# Patient Record
Sex: FEMALE | Race: WHITE | NOT HISPANIC OR LATINO | Employment: UNEMPLOYED | URBAN - METROPOLITAN AREA
[De-identification: names, ages, dates, MRNs, and addresses within clinical notes are randomized per-mention and may not be internally consistent; named-entity substitution may affect disease eponyms.]

---

## 2018-05-15 ENCOUNTER — HOSPITAL ENCOUNTER (EMERGENCY)
Facility: HOSPITAL | Age: 38
Discharge: HOME | End: 2018-05-15
Attending: EMERGENCY MEDICINE
Payer: COMMERCIAL

## 2018-05-15 ENCOUNTER — APPOINTMENT (EMERGENCY)
Dept: RADIOLOGY | Facility: HOSPITAL | Age: 38
End: 2018-05-15
Payer: COMMERCIAL

## 2018-05-15 VITALS
HEART RATE: 70 BPM | OXYGEN SATURATION: 100 % | HEIGHT: 64 IN | SYSTOLIC BLOOD PRESSURE: 172 MMHG | RESPIRATION RATE: 16 BRPM | TEMPERATURE: 98.1 F | WEIGHT: 285.06 LBS | BODY MASS INDEX: 48.67 KG/M2 | DIASTOLIC BLOOD PRESSURE: 93 MMHG

## 2018-05-15 DIAGNOSIS — S86.811A STRAIN OF CALF MUSCLE, RIGHT, INITIAL ENCOUNTER: Primary | ICD-10-CM

## 2018-05-15 PROCEDURE — 73590 X-RAY EXAM OF LOWER LEG: CPT

## 2018-05-15 PROCEDURE — 99283 EMERGENCY DEPT VISIT LOW MDM: CPT

## 2018-05-15 NOTE — ED PROVIDER NOTES
History  Chief Complaint   Patient presents with    Leg Pain     Pt  was getting child out of car and felt a "Pop" in right calf, now experiencing pain     30 yr female - states stepped foreward with r foot to get child out of car seat and felt pop then pain in r upper calf-- no fall or any other injury --         History provided by:  Patient   used: No        Prior to Admission Medications   Prescriptions Last Dose Informant Patient Reported? Taking?   amoxicillin (AMOXIL) 500 mg capsule   Yes No   Sig: Take 500 mg by mouth 2 (two) times a day  X 10 days- started 2/13/15   amoxicillin (AMOXIL) 875 mg tablet   No No   Sig: Take 1 tablet (875 mg total) by mouth 2 (two) times a day  ibuprofen (MOTRIN) 200 mg tablet   Yes No   Sig: Take 200 mg by mouth every 6 (six) hours as needed for mild pain  ibuprofen (MOTRIN) 600 mg tablet   No No   Sig: Take 1 tablet (600 mg total) by mouth every 6 (six) hours as needed for mild pain for up to 15 doses  Facility-Administered Medications: None       Past Medical History:   Diagnosis Date    Ovarian cyst        Past Surgical History:   Procedure Laterality Date     SECTION         No family history on file  I have reviewed and agree with the history as documented  Social History   Substance Use Topics    Smoking status: Never Smoker    Smokeless tobacco: Never Used    Alcohol use No        Review of Systems   Constitutional: Negative  HENT: Negative  Eyes: Negative  Respiratory: Negative  Cardiovascular: Negative  Gastrointestinal: Negative  Endocrine: Negative  Genitourinary: Negative  Musculoskeletal: Negative  R upper calf pain    Skin: Negative  Allergic/Immunologic: Negative  Neurological: Negative  Hematological: Negative  Psychiatric/Behavioral: Negative          Physical Exam  ED Triage Vitals [05/15/18 0951]   Temperature Pulse Respirations Blood Pressure SpO2   98 1 °F (36 7 °C) 70 16 (!) 172/93 100 %      Temp Source Heart Rate Source Patient Position - Orthostatic VS BP Location FiO2 (%)   Oral Monitor Sitting Right arm --      Pain Score       9           Orthostatic Vital Signs  Vitals:    05/15/18 0951   BP: (!) 172/93   Pulse: 70   Patient Position - Orthostatic VS: Sitting       Physical Exam   Constitutional: She appears well-developed and well-nourished  No distress  avss--  Pulse ox 100 % on ra- intepretation is normal- no intervention    Musculoskeletal:   rle- nt at hip/groin/ pelvis- knee-- pos prox lateral calf area of tenderness- no ecchymosis-- nt achilles - with normal achilles tendon function -- ankle nt- normal distal pulse-sensation/strength/cap refill/rom   Skin: She is not diaphoretic  Nursing note and vitals reviewed  ED Medications  Medications - No data to display    Diagnostic Studies  Results Reviewed     None                 XR tibia fibula 2 views RIGHT    (Results Pending)              Procedures  Procedures       Phone Contacts  ED Phone Contact    ED Course  ED Course as of May 15 1133   Tue May 15, 2018   1109 R tib/fib xray - no evidence of fx                                 MDM  CritCare Time    Disposition  Final diagnoses:   None     ED Disposition     None      Follow-up Information    None       Patient's Medications   Discharge Prescriptions    No medications on file     No discharge procedures on file      ED Provider  Electronically Signed by           Kiarra Church MD  05/15/18 3440

## 2018-05-15 NOTE — DISCHARGE INSTRUCTIONS
Diagnosis; RIGHT UPPER CALF MUSCLE STRAIN       - ACTIVITY AS TOLERATED    - LIGHT NOT TIGHT ACE WRAP TO AREA FOR COMFORT- CRUTCHES AS NEEDED     - FOR PAIN- TAKE OVER THE COUNTER IBUPROFEN 400 MG - TAKEN WITH OVER THE COUNTER TYLENOL 500 MG - GET GENERICS- 4 TIMES A DAY  WITH MEALS/LIQUIDS    - INTERMITENT ICE TO AREA OF CALF- FOR NEXT 24 HRS- 3-4 TIMES AT 15 MINUTES AT A TIME    - WHEN RESTING ELEVATE RIGHT LOWER LEG     - EXPECT DO TO GRAVITY FOR SWELLING / BRUISING TO DEVELOP LOWER IN CALF AND AROUND ANKLE OVER NEXT SEVERAL DAYS TO WEEK - AND TO RESOLVE ON ITS  OWN OVER SEVERAL WEEKS    - IF NOT GETTING ANY BETTER AFTER 1 WEEK - PLEASE CALL  THE ORTHOPEDIC DOCTOR TO SCHEDULE AN APPOINTMENT- YOU CAN SEE ANY DOCTOR IN T HE GROUP

## 2019-09-19 NOTE — ED NOTES
Pt appeared to be in no acute distress upon discharge  Verbal understanding obtained from pt on d/c instructions as well as medications and follow up care  Pt able to ambulate well with crutches  Pt seen assessed and d/c by provider       Blanca Sher RN  05/15/18 2656
English

## 2021-04-13 DIAGNOSIS — Z23 ENCOUNTER FOR IMMUNIZATION: ICD-10-CM

## 2022-02-24 ENCOUNTER — OFFICE VISIT (OUTPATIENT)
Dept: INTERNAL MEDICINE CLINIC | Facility: CLINIC | Age: 42
End: 2022-02-24
Payer: COMMERCIAL

## 2022-02-24 VITALS
TEMPERATURE: 97.3 F | DIASTOLIC BLOOD PRESSURE: 118 MMHG | HEART RATE: 105 BPM | WEIGHT: 291 LBS | SYSTOLIC BLOOD PRESSURE: 180 MMHG | OXYGEN SATURATION: 98 % | BODY MASS INDEX: 49.68 KG/M2 | HEIGHT: 64 IN

## 2022-02-24 DIAGNOSIS — Z00.00 ENCOUNTER FOR MEDICAL EXAMINATION TO ESTABLISH CARE: ICD-10-CM

## 2022-02-24 DIAGNOSIS — E66.2 CLASS 3 OBESITY WITH ALVEOLAR HYPOVENTILATION WITHOUT SERIOUS COMORBIDITY WITH BODY MASS INDEX (BMI) OF 45.0 TO 49.9 IN ADULT (HCC): ICD-10-CM

## 2022-02-24 DIAGNOSIS — G47.33 OSA (OBSTRUCTIVE SLEEP APNEA): ICD-10-CM

## 2022-02-24 DIAGNOSIS — E55.9 HYPOVITAMINOSIS D: ICD-10-CM

## 2022-02-24 DIAGNOSIS — I10 PRIMARY HYPERTENSION: Primary | ICD-10-CM

## 2022-02-24 PROBLEM — E66.813: Status: ACTIVE | Noted: 2022-02-24

## 2022-02-24 PROCEDURE — 99204 OFFICE O/P NEW MOD 45 MIN: CPT | Performed by: HOSPITALIST

## 2022-02-24 RX ORDER — MELATONIN
1000 DAILY
Qty: 90 TABLET | Refills: 2 | Status: SHIPPED | OUTPATIENT
Start: 2022-02-24

## 2022-02-24 RX ORDER — LOSARTAN POTASSIUM 25 MG/1
25 TABLET ORAL DAILY
Qty: 90 TABLET | Refills: 1 | Status: SHIPPED | OUTPATIENT
Start: 2022-02-24 | End: 2022-06-07

## 2022-02-24 NOTE — PROGRESS NOTES
INTERNAL MEDICINE INITIAL OFFICE VISIT  [facility] - [facility] INTERNAL MEDICINE [place]    NAME: Dulce Maria Rubio  AGE: 46 y o  SEX: female  : 1980     DATE: 2022     Assessment and Plan:     1  Primary hypertension  Patient with a history of hypertension and was on lisinopril until 2 years ago when she stopped it on her own  Blood pressure recently noted to be 180/110  Denies any history of headaches, abdominal pain, chest pain, palpitations, shortness of breath  Has a strong family history of hypertension in mother father and brother  Will start patient on losartan 25 mg daily and titrate depending on blood pressure medicines  May also need an evaluation for sleep apnea and will also need weight loss  Dietary advice given  2  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 with body mass index (BMI) of 45 0 to 49 9 in [facility])  Has thick neck, crowded posterior pharynx, BMI of 49  Denies any snoring but  states that she tosses and turns in bed   Patient has a stop Bang score of 4--BMI more than 35, neck circumference within 40, treated for high blood pressure, feeling tired fatigued sleepy--giving her high risk for obstructive sleep apnea  Will refer her for a sleep study as an outpatient--patient currently does not want to be referred will do it next visit    3  Encounter for medical examination to establish care  See my notes above    4-hypovitaminosis D-patient's level was 90 when checked around 2 years ago  Patient is an indoor person    Recommended 1000 units of vitamin-D and check vitamin-D levels today    Plan to check  Glycosylated hemoglobin  Vitamin-D  Lipid profile  TSH    Recommended Pap smear, mammogram as preventive measures however patient wants to wait to low blood pressures control before going ahead with any of these  Vaccinated against COVID  3 months     Chief Complaint:     Chief Complaint   Patient presents with   [facility] Establish Care     high blood pressure      History of Present Illness:     Ann Leung is an extremely pleasant 57-year-old female coming to establish care at this clinic  Last follow-up with the primary care was 5 years ago  Patient had dental work recently and was noted to have elevated blood pressure  Prior to that was on lisinopril which she stopped 2 years ago has not resumed  Denies any chest pain palpitations, shortness of bread  History of mother and father with high blood pressure  Complains of some weakness fatigue tiredness  Denies any snoring but tosses and turns in bed  BMI is 49+ P blood pressure today was 180/118  No visual complaints  Vitamin-D level done reviously was low at 20  Periods are regular      The following portions of the patient's history were reviewed and updated as appropriate: allergies, current medications, past family history, past medical history, past social history, past surgical history and problem list      Review of Systems:     Review of Systems all other review of symptoms negative unless specified otherwise     Past Medical History:     Past Medical History:   Diagnosis Date    Ovarian cyst         Past Surgical History:     Past Surgical History:   Procedure Laterality Date     SECTION      x2    HERNIA REPAIR      OVARIAN CYST REMOVAL          Social History:   She reports that she has never smoked  She has never used smokeless tobacco  She reports that she does not drink alcohol and does not use drugs  Family History:     Family History   Problem Relation Age of Onset   [facility] Breast cancer Mother     Hypertension Mother     Hypertension Father         Current Medications:     Current Outpatient Medications:     amoxicillin (AMOXIL) 875 mg tablet, Take 1 tablet (875 mg total) by mouth 2 (two) times a day   (Patient not taking: Reported on 2022 ), Disp: 20 tablet, Rfl: 0    ibuprofen (MOTRIN) 200 mg tablet, Take 200 mg by mouth every 6 (six) hours as needed for mild pain  (Patient not taking: Reported on 2/24/2022 ), Disp: , Rfl:     ibuprofen (MOTRIN) 600 mg tablet, Take 1 tablet (600 mg total) by mouth every 6 (six) hours as needed for mild pain for up to 15 doses  (Patient not taking: Reported on 2/24/2022 ), Disp: 15 tablet, Rfl: 0     Allergies:   No Known Allergies     Physical Exam:     BP (!) 180/118 (BP Location: Left arm, Patient Position: Sitting, Cuff Size: Standard)   Pulse 105   Temp (!) 97 3 °F (36 3 °C) (Tympanic)   Ht 5' 4" (1 626 m)   Wt 132 kg (291 lb)   SpO2 98%   BMI 49 95 kg/m²     Physical Exam   General Appearance:    Alert, cooperative, no distress, obese  Thick neck, crowded posterior pharynx  Head:    Normocephalic, without obvious abnormality, atraumatic   Eyes:    PERRL, conjunctiva/corneas clear, EOM's intact, fundi     benign, both eyes        Neck:   Supple, no adenopathy, no JVD   Back:     Symmetric, no curvature, ROM normal, no CVA tenderness   Lungs:     Clear to auscultation bilaterally, no wheezing or rhonchi   Heart:    Regular rate and rhythm, S1 and S2 normal, no murmur, rub   or gallop   Abdomen:     Soft, non-tender, bowel sounds active    Extremities:   Extremities normal, atraumatic, no cyanosis or edema   Psych:   Normal Affect   Neurologic:   CNII-XII intact   Normal strength, sensation and reflexes       Throughout        Data:     Laboratory Results:  None available-order  Radiology/Other Diagnostic Testing Results:  None available    Tianna French MD  [facility] INTERNAL MEDICINE [place]

## 2022-02-24 NOTE — PATIENT INSTRUCTIONS
Below you will find a very helpful diet, known as the DASH diet which is beneficial especially for blood pressure, but also excellent for those with diabetes or cholesterol problems  If you will apply yourself to this, I think you will find it very helpful  Your Guide to Lowering Your Blood Pressure With DASH     Introduction  Blood pressure is the force of blood against artery walls  What you choose to eat affects your chances of developing high blood pressure, or hypertension (the medical term)  Recent studies show that blood pressure can be lowered by following the Dietary Approaches to Stop Hypertension (DASH) eating plan--and by eating less salt, also called sodium  While each step alone lowers blood pressure, the combination of the eating plan and a reduced sodium intake gives the biggest benefit and may help prevent the development of high blood pressure  The menus and recipes are given for two levels of daily sodium consumption-- 2,300 and 1,500 milligrams per day  Twenty-three hundred milligrams is the highest level considered acceptable by the [facility] Blood Pressure Education Program  It is also the highest amount recommended for healthy Americans by the 2005 "[place]  Dietary Guidelines for Americans " The 1,500 milligram level can lower blood pressure further and more recently is the amount recommended by the [address] as an adequate intake level and one that most people should try to achieve  It's easy to adopt the DASH eating plan  Here are some ways to get started:     Change gradually   If you now eat one or two vegetables a day, add a serving at lunch and another at dinner  If you don't eat fruit now or have juice only at breakfast, add a serving to your meals or have it as a snack  Gradually increase your use of fat-free and low-fat milk and milk products to three servings a day   For example, drink milk with lunch or dinner, instead of soda, sugar--sweetened tea, or alcohol  Choose fat-free (skim) or low-fat (1 percent) milk and milk products to reduce your intake of saturated fat, total fat, cholesterol, and calories and to increase your calcium  Read the Nutrition Facts label on margarines and salad dressings to choose those lowest in saturated fat and trans fat  Treat meats as one part of the whole meal, instead of the focus   Limit lean meats to 6 ounces a day & all that's needed  Have only 3 ounces at a meal, which is about the size of a deck of cards  If you now eat large portions of meats, cut them back gradually-- by a half or a third at each meal    Include two or more vegetarian-style (meatless) meals each week  Increase servings of vegetables, brown rice, whole wheat pasta, and cooked dry beans in meals  Try casseroles, whole wheat pasta, and stir-jacobson dishes, which have less meat and more vegetables, grains, and dry beans  Use fruits or other foods low in saturated fat, trans fat, cholesterol, sodium, sugar, and calories as desserts and snacks   Fruits and other lower fat foods offer great taste and variety  Use fruits canned in their own juice or packed in water  Fresh fruits require little or no preparation  Dried fruits are a good choice to carry with you or to have ready in the car  Try these snacks ideas: unsalted rice cakes; nuts mixed with raisins; carlotta crackers; fat-free and low-fat yogurt and frozen yogurt; popcorn with no salt or butter added; raw vegetables  Try these other tips   Choose whole grain foods for most grain servings to get added nutrients, such as minerals and fiber  For example, choose whole wheat bread or whole grain cereals  If you have trouble digesting milk and milk products, try taking lactase enzyme pills (available at drugstores and groceries) with the milk products  Or, buy lactose-free milk, which has the lactase enzyme already added to it     If you are allergic to nuts, use seeds or legumes (cooked dried beans or peas)  Use fresh, frozen, or low-sodium canned vegetables and fruits  How to Lower Calories on the [address] [place] [address]  The DASH eating plan can be adopted to promote weight loss  It is rich in lower-calorie foods, such as fruits and vegetables  You can make it lower in calories by replacing higher calorie foods such as sweets with more fruits and vegetables and that also will make it easier for you to reach your DASH goals  Here are some examples: To increase fruits   Eat a medium apple instead of four shortbread cookies  You'll save 80 calories  Eat 1/4 cup of dried apricots instead of a 2-ounce bag of pork rinds  You'll save 230 calories  To increase vegetables   Have a hamburger that's 3 ounces of meat instead of 6 ounces  Add a 1/2-cup serving of carrots and a 1/2-cup serving of spinach  You'll save more than 200 calories  Instead of 5 ounces of chicken, have a stir jacobson with 2 ounces of chicken and 11/2 cups of raw vegetables  Use a small amount of vegetable oil  You'll save 50 calories  To increase fat-free or low-fat milk products   Have a 1/2-cup serving of low-fat frozen yogurt instead of a 1/2-cup serving of full-fat ice cream  You'll save about 70 calories  Choose foods high in potassium  Where's the Potassium?  Potato, sweet potato, spinach, zucchini, tomato, kale, nicolle, mushrooms, cucumber, various fruits, nuts, seeds, and legumes, milk and some fish  And don't forget these calorie-saving tips:   Use fat-free or low-fat condiments  Use half as much vegetable oil, soft or liquid margarine, mayonnaise, or salad dressing, or choose available low-fat or fat-free versions  Eat smaller portions and cut back gradually  Choose fat-free or low-fat milk and milk products  Check the food labels to compare fat content in packaged foods and items marked fat-free or low-fat are not always lower in calories than their regular versions     Limit foods with lots of added sugar, such as pies, flavored yogurts, candy bars, ice cream, sherbet, regular soft drinks, and fruit drinks  Eat fruits canned in their own juice or in water  Add fruit to plain fat-free or low-fat yogurt  Snack on fruit, vegetable sticks, unbuttered and unsalted popcorn, or rice cakes  Drink water or club soda & zest it up with a wedge of lemon or lime  Tips To Reduce Salt and Sodium   Choose low- or reduced-sodium, or no-salt-added versions of foods and condiments when available  Choose fresh, frozen, or canned (low-sodium or no-salt-added) vegetables  Use fresh poultry, fish, and lean meat, rather than canned, smoked, or processed types  Choose ready-to-eat breakfast cereals that are lower in sodium  Limit cured foods (such as bae and ham); foods packed in brine (such as pickles, pickled vegetables, olives, and sauerkraut); and condiments (such as mustard, horseradish, ketchup, and barbecue sauce)  Limit even lower sodium versions of soy sauce and teriyaki sauce  Treat these condiments sparingly as you do table salt  Cook rice, pasta, and hot cereals without salt  Cut back on instant or flavored rice, pasta, and cereal mixes, which usually have added salt  Choose "convenience" foods that are lower in sodium  Cut back on frozen dinners, mixed dishes such as pizza, packaged mixes, canned soups or broths, and salad dressings--these often have a lot of sodium  Rinse canned foods, such as tuna and canned beans, to remove some of the sodium  Use spices instead of salt  In cooking and at the table, flavor foods with herbs, spices, lemon, lime, vinegar, or salt-free seasoning blends  Start by cutting salt in half  Make a Dash for DASH  Thirty minutes of moderate-intensity physical activity each day can help      If your blood pressure is moderately elevated, 30 minutes of brisk walking on most days a week may be enough to keep you off medication      If you take medication for high blood pressure, 30 minutes of moderate physical activity can make your medication work more effectively and make you feel better  If you don't have high blood pressure, being physically active can help keep it that way  If you have normal blood pressure & but are not active & your chances of developing high blood pressure increase, especially as you get older or if you become overweight or obese or develop diabetes  Additional tips to remember: Monitor salt and sodium when eating out and compare nutrition facts labels on foods  For more information and meals plans check out this website: [employer]

## 2022-06-07 ENCOUNTER — OFFICE VISIT (OUTPATIENT)
Dept: INTERNAL MEDICINE CLINIC | Facility: CLINIC | Age: 42
End: 2022-06-07
Payer: COMMERCIAL

## 2022-06-07 ENCOUNTER — APPOINTMENT (OUTPATIENT)
Dept: LAB | Facility: HOSPITAL | Age: 42
End: 2022-06-07
Attending: HOSPITALIST
Payer: COMMERCIAL

## 2022-06-07 ENCOUNTER — OFFICE VISIT (OUTPATIENT)
Dept: INTERNAL MEDICINE CLINIC | Facility: CLINIC | Age: 42
End: 2022-06-07

## 2022-06-07 VITALS
OXYGEN SATURATION: 97 % | SYSTOLIC BLOOD PRESSURE: 140 MMHG | HEART RATE: 83 BPM | WEIGHT: 288.6 LBS | DIASTOLIC BLOOD PRESSURE: 90 MMHG | BODY MASS INDEX: 49.27 KG/M2 | HEIGHT: 64 IN | TEMPERATURE: 98.2 F

## 2022-06-07 DIAGNOSIS — E55.9 HYPOVITAMINOSIS D: ICD-10-CM

## 2022-06-07 DIAGNOSIS — E66.2 CLASS 3 OBESITY WITH ALVEOLAR HYPOVENTILATION WITHOUT SERIOUS COMORBIDITY WITH BODY MASS INDEX (BMI) OF 45.0 TO 49.9 IN ADULT (HCC): ICD-10-CM

## 2022-06-07 DIAGNOSIS — Z12.31 ENCOUNTER FOR SCREENING MAMMOGRAM FOR MALIGNANT NEOPLASM OF BREAST: ICD-10-CM

## 2022-06-07 DIAGNOSIS — Z80.3 FAMILY HISTORY OF BREAST CANCER: ICD-10-CM

## 2022-06-07 DIAGNOSIS — I10 PRIMARY HYPERTENSION: ICD-10-CM

## 2022-06-07 DIAGNOSIS — G47.33 OSA (OBSTRUCTIVE SLEEP APNEA): ICD-10-CM

## 2022-06-07 DIAGNOSIS — E66.01 MORBID OBESITY WITH BMI OF 45.0-49.9, ADULT (HCC): ICD-10-CM

## 2022-06-07 DIAGNOSIS — Z00.00 ANNUAL PHYSICAL EXAM: Primary | ICD-10-CM

## 2022-06-07 DIAGNOSIS — Z12.4 SCREENING FOR CERVICAL CANCER: ICD-10-CM

## 2022-06-07 LAB
ANION GAP SERPL CALCULATED.3IONS-SCNC: 8 MMOL/L (ref 4–13)
BUN SERPL-MCNC: 12 MG/DL (ref 5–25)
CALCIUM SERPL-MCNC: 8.9 MG/DL (ref 8.4–10.2)
CHLORIDE SERPL-SCNC: 104 MMOL/L (ref 96–108)
CHOLEST SERPL-MCNC: 162 MG/DL
CO2 SERPL-SCNC: 26 MMOL/L (ref 21–32)
CREAT SERPL-MCNC: 0.81 MG/DL (ref 0.6–1.3)
ERYTHROCYTE [DISTWIDTH] IN BLOOD BY AUTOMATED COUNT: 13.4 % (ref 11.6–15.1)
EST. AVERAGE GLUCOSE BLD GHB EST-MCNC: 103 MG/DL
GFR SERPL CREATININE-BSD FRML MDRD: 91 ML/MIN/1.73SQ M
GLUCOSE P FAST SERPL-MCNC: 91 MG/DL (ref 65–99)
HBA1C MFR BLD: 5.2 %
HCT VFR BLD AUTO: 40.1 % (ref 34.8–46.1)
HDLC SERPL-MCNC: 44 MG/DL
HGB BLD-MCNC: 13 G/DL (ref 11.5–15.4)
LDLC SERPL CALC-MCNC: 101 MG/DL (ref 0–100)
MCH RBC QN AUTO: 27.8 PG (ref 26.8–34.3)
MCHC RBC AUTO-ENTMCNC: 32.4 G/DL (ref 31.4–37.4)
MCV RBC AUTO: 86 FL (ref 82–98)
NONHDLC SERPL-MCNC: 118 MG/DL
PLATELET # BLD AUTO: 234 THOUSANDS/UL (ref 149–390)
PMV BLD AUTO: 11 FL (ref 8.9–12.7)
POTASSIUM SERPL-SCNC: 3.8 MMOL/L (ref 3.5–5.3)
RBC # BLD AUTO: 4.68 MILLION/UL (ref 3.81–5.12)
SODIUM SERPL-SCNC: 138 MMOL/L (ref 135–147)
TRIGL SERPL-MCNC: 83 MG/DL
TSH SERPL DL<=0.05 MIU/L-ACNC: 1.21 UIU/ML (ref 0.45–4.5)
WBC # BLD AUTO: 5.31 THOUSAND/UL (ref 4.31–10.16)

## 2022-06-07 PROCEDURE — 80048 BASIC METABOLIC PNL TOTAL CA: CPT

## 2022-06-07 PROCEDURE — 36415 COLL VENOUS BLD VENIPUNCTURE: CPT

## 2022-06-07 PROCEDURE — 99395 PREV VISIT EST AGE 18-39: CPT | Performed by: INTERNAL MEDICINE

## 2022-06-07 PROCEDURE — 80061 LIPID PANEL: CPT

## 2022-06-07 PROCEDURE — 83036 HEMOGLOBIN GLYCOSYLATED A1C: CPT

## 2022-06-07 PROCEDURE — ERR1 ERRONEOUS ENCOUNTER-DISREGARD: Performed by: INTERNAL MEDICINE

## 2022-06-07 PROCEDURE — 1036F TOBACCO NON-USER: CPT | Performed by: INTERNAL MEDICINE

## 2022-06-07 PROCEDURE — 85027 COMPLETE CBC AUTOMATED: CPT

## 2022-06-07 PROCEDURE — 84443 ASSAY THYROID STIM HORMONE: CPT

## 2022-06-07 RX ORDER — LOSARTAN POTASSIUM 25 MG/1
50 TABLET ORAL DAILY
Qty: 60 TABLET | Refills: 0 | Status: SHIPPED | OUTPATIENT
Start: 2022-06-07 | End: 2022-07-05

## 2022-06-07 NOTE — ASSESSMENT & PLAN NOTE
Blood pressure was reviewing office it is 140/90   much improved from previous visit   will increase losartan to 50 mg once a day   monitor BMP in 2 weeks   follow-up in 1  Month for blood pressure

## 2022-06-07 NOTE — ASSESSMENT & PLAN NOTE
Patient reported her mother  had history of breast cancer,  Unilaterally in her 46s  Patient is considered moderate risk    Would recommend mammogram as early as possible

## 2022-06-07 NOTE — PATIENT INSTRUCTIONS
Wellness Visit for Adults   AMBULATORY CARE:   A wellness visit  is when you see your healthcare provider to get screened for health problems  Your healthcare provider will also give you advice on how to stay healthy  Write down your questions so you remember to ask them  Ask your healthcare provider how often you should have a wellness visit  What happens at a wellness visit:  Your healthcare provider will ask about your health, and your family history of health problems  This includes high blood pressure, heart disease, and cancer  He or she will ask if you have symptoms that concern you, if you smoke, and about your mood  You may also be asked about your intake of medicines, supplements, food, and alcohol  Any of the following may be done: Your weight  will be checked  Your height may also be checked so your body mass index (BMI) can be calculated  Your BMI shows if you are at a healthy weight  Your blood pressure  and heart rate will be checked  Your temperature may also be checked  Blood and urine tests  may be done  Blood tests may be done to check your cholesterol levels  Abnormal cholesterol levels increase your risk for heart disease and stroke  You may also need a blood or urine test to check for diabetes if you are at increased risk  Urine tests may be done to look for signs of an infection or kidney disease  A physical exam  includes checking your heartbeat and lungs with a stethoscope  Your healthcare provider may also check your skin to look for sun damage  Screening tests  may be recommended  A screening test is done to check for diseases that may not cause symptoms  The screening tests you may need depend on your age, gender, family history, and lifestyle habits  For example, colorectal screening may be recommended if you are 48years old or older  Screening tests you need if you are a woman:   A Pap smear  is used to screen for cervical cancer   Pap smears are usually done every 3 to 5 years depending on your age  You may need them more often if you have had abnormal Pap smear test results in the past  Ask your healthcare provider how often you should have a Pap smear  A mammogram  is an x-ray of your breasts to screen for breast cancer  Experts recommend mammograms every 2 years starting at age 48 years  You may need a mammogram at age 52 years or younger if you have an increased risk for breast cancer  Talk to your healthcare provider about when you should start having mammograms and how often you need them  Vaccines you may need:   Get an influenza vaccine  every year  The influenza vaccine protects you from the flu  Several types of viruses cause the flu  The viruses change over time, so new vaccines are made each year  Get a tetanus-diphtheria (Td) booster vaccine  every 10 years  This vaccine protects you against tetanus and diphtheria  Tetanus is a severe infection that may cause painful muscle spasms and lockjaw  Diphtheria is a severe bacterial infection that causes a thick covering in the back of your mouth and throat  Get a human papillomavirus (HPV) vaccine  if you are female and aged 23 to 32 or male 23 to 24 and never received it  This vaccine protects you from HPV infection  HPV is the most common infection spread by sexual contact  HPV may also cause vaginal, penile, and anal cancers  Get a pneumococcal vaccine  if you are aged 72 years or older  The pneumococcal vaccine is an injection given to protect you from pneumococcal disease  Pneumococcal disease is an infection caused by pneumococcal bacteria  The infection may cause pneumonia, meningitis, or an ear infection  Get a shingles vaccine  if you are 60 or older, even if you have had shingles before  The shingles vaccine is an injection to protect you from the varicella-zoster virus  This is the same virus that causes chickenpox   Shingles is a painful rash that develops in people who had chickenpox or have been exposed to the virus  How to eat healthy:  My Plate is a model for planning healthy meals  It shows the types and amounts of foods that should go on your plate  Fruits and vegetables make up about half of your plate, and grains and protein make up the other half  A serving of dairy is included on the side of your plate  The amount of calories and serving sizes you need depends on your age, gender, weight, and height  Examples of healthy foods are listed below:  Eat a variety of vegetables  such as dark green, red, and orange vegetables  You can also include canned vegetables low in sodium (salt) and frozen vegetables without added butter or sauces  Eat a variety of fresh fruits , canned fruit in 100% juice, frozen fruit, and dried fruit  Include whole grains  At least half of the grains you eat should be whole grains  Examples include whole-wheat bread, wheat pasta, brown rice, and whole-grain cereals such as oatmeal     Eat a variety of protein foods such as seafood (fish and shellfish), lean meat, and poultry without skin (turkey and chicken)  Examples of lean meats include pork leg, shoulder, or tenderloin, and beef round, sirloin, tenderloin, and extra lean ground beef  Other protein foods include eggs and egg substitutes, beans, peas, soy products, nuts, and seeds  Choose low-fat dairy products such as skim or 1% milk or low-fat yogurt, cheese, and cottage cheese  Limit unhealthy fats  such as butter, hard margarine, and shortening  Exercise:  Exercise at least 30 minutes per day on most days of the week  Some examples of exercise include walking, biking, dancing, and swimming  You can also fit in more physical activity by taking the stairs instead of the elevator or parking farther away from stores  Include muscle strengthening activities 2 days each week  Regular exercise provides many health benefits   It helps you manage your weight, and decreases your risk for type 2 diabetes, heart disease, stroke, and high blood pressure  Exercise can also help improve your mood  Ask your healthcare provider about the best exercise plan for you  General health and safety guidelines:   Do not smoke  Nicotine and other chemicals in cigarettes and cigars can cause lung damage  Ask your healthcare provider for information if you currently smoke and need help to quit  E-cigarettes or smokeless tobacco still contain nicotine  Talk to your healthcare provider before you use these products  Limit alcohol  A drink of alcohol is 12 ounces of beer, 5 ounces of wine, or 1½ ounces of liquor  Lose weight, if needed  Being overweight increases your risk of certain health conditions  These include heart disease, high blood pressure, type 2 diabetes, and certain types of cancer  Protect your skin  Do not sunbathe or use tanning beds  Use sunscreen with a SPF 15 or higher  Apply sunscreen at least 15 minutes before you go outside  Reapply sunscreen every 2 hours  Wear protective clothing, hats, and sunglasses when you are outside  Drive safely  Always wear your seatbelt  Make sure everyone in your car wears a seatbelt  A seatbelt can save your life if you are in an accident  Do not use your cell phone when you are driving  This could distract you and cause an accident  Pull over if you need to make a call or send a text message  Practice safe sex  Use latex condoms if are sexually active and have more than one partner  Your healthcare provider may recommend screening tests for sexually transmitted infections (STIs)  Wear helmets, lifejackets, and protective gear  Always wear a helmet when you ride a bike or motorcycle, go skiing, or play sports that could cause a head injury  Wear protective equipment when you play sports  Wear a lifejacket when you are on a boat or doing water sports      © Copyright [employer] 2022 Information is for End User's use only and may not be sold, redistributed or otherwise used for commercial purposes  All illustrations and images included in CareNotes® are the copyrighted property of A D A M , Inc  or Nam Unger  The above information is an  only  It is not intended as medical advice for individual conditions or treatments  Talk to your doctor, nurse or pharmacist before following any medical regimen to see if it is safe and effective for you  Champ Dumontosmany              6/7/2022    Physical Activity Guidelines for Adults:  150-300 minutes/week of moderate-intensity activity or  minutes/week of vigorous activity or a combination of both  Muscle strength training 2 or more times a week    Aerobic Activity Recommendation:   Frequency:    5 (days/week)  Intensity:  Light (casual walk)   Moderate (brisk walk)     Vigorous (like jogging)  Time:  30 minutes/day  Type:  Walk, Run,  Bike  Swim/Water Exercise   Other                     What about aerobic activity? Moderate activity is at a pace where you can talk, but cannot "sing " Examples: brisk walking, light biking, water exercise, & dancing  Vigorous activty is done at a pace where you cannot talk and may be out of breath  Examples: jogging, swimming, tennis, & fast bicycling  You can exercise for any length of time  For example, you might walk:   30 minutes 5 days/week or   20 minutes daily  5 minutes here, 10 minutes there  Just work your way up to 150 total minutes/week  Your ultimate goal is to gradually build up to 7500-10,000 steps/day  Muscle Strength Training Recommendation:   Frequency:    2 (days/week)    What about strength training? You don't have to go to a gym  Try elastic bands, do body weight exercises (chair sit-to-stands; floor, wall or kitchen counter push-ups; planks or bridges), or lift dumbbells  Heavy work around your home or yard also builds strength  Strengthen your legs, back, chest & arms   To start, try 10-15 repetitions using light effort  Build up to medium or hard effort for 8-12 repetitions  Repeat 2-4 times, 2-3 days/week  Give yourself a rest day between each strength training session  Prescriber's Name:   Dr Verner Malling     How will you get started THIS WEEK? Copyright 2019: Exercise is Medicine     Mediterranean Diet   AMBULATORY CARE:   A Mediterranean diet  is a meal plan that includes foods that are commonly eaten in countries that border the [facility]  This meal plan may provide several health benefits  These include losing or maintaining weight, and decreasing blood pressure, blood sugar, and cholesterol levels  It may also help protect against certain health conditions such as heart disease, cancer, type 2 diabetes, and Alzheimer disease  Work with a dietitian to develop a meal plan that is right for you  Foods to include in the [address] [facility] diet:   Include fruits and vegetables in each meal   Eat a variety of fresh fruits and vegetables  Choose whole grains every day  These foods include whole-grain breads, pastas, and cereals  It also includes brown rice, quinoa, and millet  Use unsaturated fats instead of saturated fats  Cook with olive or canola oil  Limit saturated fats, such as butter, margarine, and shortening  Saturated fat is an unhealthy fat that can increase your cholesterol levels  Choose plant foods, poultry, and fish as your main sources of protein  Eat plant-based foods that provide protein,  such as lentils, beans, chickpeas, nuts, and seeds  Choose mostly plant-based foods in place of meat on most days of the week  Eat protein foods high in omega-3 fats  Fish high in omega-3 fats include salmon, trout, and tuna  Include these types of fish 1 or 2 times each week  Limit fish high in mercury, such as shark, swordfish, tilefish, and bridgette mackerel  Omega-3 fats are also found in walnuts and flaxseed           Choose poultry (chicken or turkey) without skin instead of red meat  Red meat is high in saturated fat  Limit eggs and high-fat meats, such as bae, sausage, and hot dogs  Choose low-fat dairy foods  such as nonfat or 1% milk, or low-fat almond, cashew, or soy milk  Other examples include low-fat cheese, yogurt, and cottage cheese  Limit sweets  Limit your intake of high-sugar foods, such as soda, desserts, and candy  Talk to your healthcare provider about alcohol  Studies have shown that moderate intake of wine may reduce the risk of heart disease  A moderate amount of wine is 1 serving for women and men 65 years and older each day  Two servings is recommended for men 24to 59years of age each day  A serving of wine is 5 ounces  Other things you need to know if you follow the Mediterranean diet:   Include foods high in iron and vitamin C   Plant-based foods that are high in iron include spinach, beans, tofu, and artichoke  Eat a serving of vitamin C with any iron-rich food to help your body absorb more iron  Examples include oranges, strawberries, cantaloupe, broccoli, and yellow peppers  Get regular physical activity  The Mediterranean diet will have the most benefit if you get regular physical activity  Get 30 minutes of physical activity at least 5 days a week  Choose physical activities that increase your heart rate  Examples include walking, hiking, swimming, and riding a bike  Ask your healthcare provider about the best exercise plan for you  © Copyright [employer] 2022 Information is for End User's use only and may not be sold, redistributed or otherwise used for commercial purposes  All illustrations and images included in CareNotes® are the copyrighted property of A D A M , Inc  or [facility] [place] Alisha Hernández   The above information is an  only  It is not intended as medical advice for individual conditions or treatments   Talk to your doctor, nurse or pharmacist before following any medical regimen to see if it is safe and effective for you

## 2022-06-07 NOTE — ASSESSMENT & PLAN NOTE
Most recent vitamin-D level was noted to be 20   she has been taking vitamin-D 1000 units once a day   will recheck vitamin-D level

## 2022-06-07 NOTE — PROGRESS NOTES
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 [facility])         Lifestyle modification   physician directed handout printed for the patient  Recommended mediterranean diet  She reported familiar with the diet as her  was also instructed to have mediterranean diet                        Cardiovascular and Mediastinum      Primary hypertension         Blood pressure was reviewing office it is 140/90   much improved from previous visit   will increase losartan to 50 mg once a day   monitor BMP in 2 weeks   follow-up in 1  Month for blood pressure               Relevant Medications      losartan (COZAAR) 25 mg tablet      Other Relevant Orders      Basic metabolic panel            Other      Hypovitaminosis D         Most recent vitamin-D level was noted to be 20   she has been taking vitamin-D 1000 units once a day   will recheck vitamin-D level               Relevant Orders      Vitamin D 25 hydroxy      Family history of breast cancer        Patient reported her mother  had history of breast cancer,  Unilaterally in her 46s  Patient is considered moderate risk    Would recommend mammogram as early as possible               Relevant Orders      Mammo screening bilateral w 3d & cad                Other Visit Diagnoses      Annual physical exam    -  Primary     Morbid obesity with BMI of 45 0-49 9, adult ([address] )         Screening for cervical cancer         Relevant Orders     Ambulatory referral to Obstetrics / Gynecology     Encounter for screening mammogram for malignant neoplasm of breast         Relevant Orders     Mammo screening bilateral w 3d & cad             Immunizations and preventive care screenings were discussed with patient today  Appropriate education was printed on patient's after visit summary      Counseling:  · Alcohol/drug use: discussed moderation in alcohol intake, the recommendations for healthy alcohol use, and avoidance of illicit drug use  · Dental Health: discussed importance of regular tooth brushing, flossing, and dental visits  · Exercise: the importance of regular exercise/physical activity was discussed  Recommend exercise 3-5 times per week for at least 30 minutes       BMI Counseling: Body mass index is 49 54 kg/m²  The BMI is above normal  Nutrition recommendations include decreasing portion sizes and moderation in carbohydrate intake  Exercise recommendations include moderate physical activity 150 minutes/week  Rationale for BMI follow-up plan is due to patient being overweight or obese             Return in 1 month (on 7/7/2022)      Chief Complaint:           Chief Complaint   Patient presents with    Follow-up       Follow up       History of Present Illness:      Adult Annual Physical   Patient here for a comprehensive physical exam  The patient reports problems - tooth ache      Diet and Physical Activity  · Diet/Nutrition: well balanced diet  · Exercise: no formal exercise       Depression Screening  PHQ-2/9 Depression Screening                 General Health  · Sleep: sleeps well  · Hearing: normal - bilateral   · Vision: no vision problems  · Dental: regular dental visits         /GYN Health  · Last menstrual period: 5/2022  · Contraceptive method: not sexually active  · History of STDs?: no       Review of Systems:      Review of Systems   Constitutional: Negative for chills and fever  HENT: Positive for dental problem  Negative for ear pain and sore throat  Eyes: Negative for pain and visual disturbance  Respiratory: Negative for cough and shortness of breath  Cardiovascular: Negative for chest pain and palpitations  Gastrointestinal: Negative for abdominal pain and vomiting  Genitourinary: Negative for dysuria and hematuria  Musculoskeletal: Positive for back pain  Negative for arthralgias  Skin: Negative for color change and rash  Neurological: Negative for seizures and syncope  All other systems reviewed and are negative       Past Medical History:      Medical History        Past Medical History:   Diagnosis Date    Ovarian cyst            Past Surgical History:      Surgical History         Past Surgical History:   Procedure Laterality Date     SECTION         x2    HERNIA REPAIR        OVARIAN CYST REMOVAL              Social History:      Social History   Social History            Socioeconomic History    Marital status: /Civil Union       Spouse name: None    Number of children: None    Years of education: None    Highest education level: None   Occupational History    None   Tobacco Use    Smoking status: Never Smoker    Smokeless tobacco: Never Used   Substance and Sexual Activity    Alcohol use:  No       Comment: Alcohol intake:   Occasional  - As per Shane Moss Drug use: No    Sexual activity: None   Other Topics Concern    None   Social History Narrative    None      Social Determinants of Health      Financial Resource Strain: Not on file   Food Insecurity: Not on file   Transportation Needs: Not on file   Physical Activity: Not on file   Stress: Not on file   Social Connections: Not on file   Intimate Partner Violence: Not on file   Housing Stability: Not on file          Family History:            Family History   Problem Relation Age of Onset    Breast cancer Mother      Hypertension Mother      Hypertension Father         Current Medications:      Current Medications          Current Outpatient Medications   Medication Sig Dispense Refill    cholecalciferol (VITAMIN D3) 1,000 units tablet Take 1 tablet (1,000 Units total) by mouth daily 90 tablet 2    losartan (COZAAR) 25 mg tablet Take 2 tablets (50 mg total) by mouth daily 60 tablet 0      No current facility-administered medications for this visit           Allergies:      No Known Allergies   Physical Exam:      /90 (BP Location: Left arm, Patient Position: Sitting, Cuff Size: Standard)   Pulse 83   Temp 98 2 °F (36 8 °C) (Tympanic)   Ht 5' 4" (1 626 m)   Wt 131 kg (288 lb 9 6 oz)   SpO2 97%   BMI 49 54 kg/m²      Physical Exam  Vitals and nursing note reviewed  Constitutional:       General: She is not in acute distress  Appearance: She is well-developed  She is obese  HENT:      Head: Normocephalic and atraumatic  Eyes:      Conjunctiva/sclera: Conjunctivae normal    Cardiovascular:      Rate and Rhythm: Normal rate and regular rhythm  Heart sounds: No murmur heard  Pulmonary:      Effort: Pulmonary effort is normal  No respiratory distress  Breath sounds: Normal breath sounds  Abdominal:      Palpations: Abdomen is soft  Tenderness: There is no abdominal tenderness  Musculoskeletal:      Cervical back: Neck supple  Right lower leg: No edema  Left lower leg: No edema  Skin:     General: Skin is warm and dry  Coloration: Skin is not jaundiced  Findings: No rash  Neurological:      Mental Status: She is alert and oriented to person, place, and time     Psychiatric:         Behavior: Behavior normal                Physical Activity Assessment and Intervention:    Exercise capacity assessment recommended    Exercise capacity assessment reviewed       Other interventions: 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

## 2022-06-07 NOTE — PROGRESS NOTES
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 [facility])      Lifestyle modification   physician directed handout printed for the patient  Recommended mediterranean diet  She reported familiar with the diet as her  was also instructed to have mediterranean diet  Cardiovascular and Mediastinum    Primary hypertension      Blood pressure was reviewing office it is 140/90   much improved from previous visit   will increase losartan to 50 mg once a day   monitor BMP in 2 weeks   follow-up in 1  Month for blood pressure           Relevant Medications    losartan (COZAAR) 25 mg tablet    Other Relevant Orders    Basic metabolic panel       Other    Hypovitaminosis D      Most recent vitamin-D level was noted to be 20   she has been taking vitamin-D 1000 units once a day   will recheck vitamin-D level           Relevant Orders    Vitamin D 25 hydroxy    Family history of breast cancer     Patient reported her mother  had history of breast cancer,  Unilaterally in her 46s  Patient is considered moderate risk  Would recommend mammogram as early as possible           Relevant Orders    Mammo screening bilateral w 3d & cad      Other Visit Diagnoses     Annual physical exam    -  Primary    Morbid obesity with BMI of 45 0-49 9, adult ([address] )        Screening for cervical cancer        Relevant Orders    Ambulatory referral to Obstetrics / Gynecology    Encounter for screening mammogram for malignant neoplasm of breast        Relevant Orders    Mammo screening bilateral w 3d & cad          Immunizations and preventive care screenings were discussed with patient today   Appropriate education was printed on patient's after visit summary  Counseling:  Alcohol/drug use: discussed moderation in alcohol intake, the recommendations for healthy alcohol use, and avoidance of illicit drug use  Dental Health: discussed importance of regular tooth brushing, flossing, and dental visits  · Exercise: the importance of regular exercise/physical activity was discussed  Recommend exercise 3-5 times per week for at least 30 minutes  BMI Counseling: Body mass index is 49 54 kg/m²  The BMI is above normal  Nutrition recommendations include decreasing portion sizes and moderation in carbohydrate intake  Exercise recommendations include moderate physical activity 150 minutes/week  Rationale for BMI follow-up plan is due to patient being overweight or obese  Return in 1 month (on 7/7/2022)  Chief Complaint:     Chief Complaint   Patient presents with    Follow-up     Follow up      History of Present Illness:     Adult Annual Physical   Patient here for a comprehensive physical exam  The patient reports problems - tooth ache  Diet and Physical Activity  · Diet/Nutrition: well balanced diet  · Exercise: no formal exercise  Depression Screening  PHQ-2/9 Depression Screening         General Health  · Sleep: sleeps well  · Hearing: normal - bilateral   · Vision: no vision problems  · Dental: regular dental visits  /GYN Health  · Last menstrual period: 5/2022  · Contraceptive method: not sexually active  · History of STDs?: no      Review of Systems:     Review of Systems   Constitutional: Negative for chills and fever  HENT: Positive for dental problem  Negative for ear pain and sore throat  Eyes: Negative for pain and visual disturbance  Respiratory: Negative for cough and shortness of breath  Cardiovascular: Negative for chest pain and palpitations  Gastrointestinal: Negative for abdominal pain and vomiting  Genitourinary: Negative for dysuria and hematuria     Musculoskeletal: Positive for back pain  Negative for arthralgias  Skin: Negative for color change and rash  Neurological: Negative for seizures and syncope  All other systems reviewed and are negative  Past Medical History:     Past Medical History:   Diagnosis Date    Ovarian cyst       Past Surgical History:     Past Surgical History:   Procedure Laterality Date     SECTION      x2    HERNIA REPAIR      OVARIAN CYST REMOVAL        Social History:     Social History     Socioeconomic History    Marital status: /Civil Union     Spouse name: None    Number of children: None    Years of education: None    Highest education level: None   Occupational History    None   Tobacco Use    Smoking status: Never Smoker    Smokeless tobacco: Never Used   Substance and Sexual Activity    Alcohol use: No     Comment: Alcohol intake:   Occasional  - As per [place] Drug use: No    Sexual activity: None   Other Topics Concern    None   Social History Narrative    None     Social Determinants of Health     Financial Resource Strain: Not on file   Food Insecurity: Not on file   Transportation Needs: Not on file   Physical Activity: Not on file   Stress: Not on file   Social Connections: Not on file   Intimate Partner Violence: Not on file   Housing Stability: Not on file      Family History:     Family History   Problem Relation Age of Onset    Breast cancer Mother     Hypertension Mother     Hypertension Father       Current Medications:     Current Outpatient Medications   Medication Sig Dispense Refill    cholecalciferol (VITAMIN D3) 1,000 units tablet Take 1 tablet (1,000 Units total) by mouth daily 90 tablet 2    losartan (COZAAR) 25 mg tablet Take 2 tablets (50 mg total) by mouth daily 60 tablet 0     No current facility-administered medications for this visit        Allergies:     No Known Allergies   Physical Exam:     /90 (BP Location: Left arm, Patient Position: Sitting, Cuff Size: Standard)   Pulse 83   Temp 98 2 °F (36 8 °C) (Tympanic)   Ht 5' 4" (1 626 m)   Wt 131 kg (288 lb 9 6 oz)   SpO2 97%   BMI 49 54 kg/m²     Physical Exam  Vitals and nursing note reviewed  Constitutional:       General: She is not in acute distress  Appearance: She is well-developed  She is obese  HENT:      Head: Normocephalic and atraumatic  Eyes:      Conjunctiva/sclera: Conjunctivae normal    Cardiovascular:      Rate and Rhythm: Normal rate and regular rhythm  Heart sounds: No murmur heard  Pulmonary:      Effort: Pulmonary effort is normal  No respiratory distress  Breath sounds: Normal breath sounds  Abdominal:      Palpations: Abdomen is soft  Tenderness: There is no abdominal tenderness  Musculoskeletal:      Cervical back: Neck supple  Right lower leg: No edema  Left lower leg: No edema  Skin:     General: Skin is warm and dry  Coloration: Skin is not jaundiced  Findings: No rash  Neurological:      Mental Status: She is alert and oriented to person, place, and time     Psychiatric:         Behavior: Behavior normal             Physical Activity Assessment and Intervention:    Exercise capacity assessment recommended    Exercise capacity assessment reviewed      Other interventions: 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

## 2022-06-07 NOTE — ASSESSMENT & PLAN NOTE
Lifestyle modification   physician directed handout printed for the patient  Recommended mediterranean diet  She reported familiar with the diet as her  was also instructed to have mediterranean diet

## 2022-07-04 DIAGNOSIS — I10 PRIMARY HYPERTENSION: ICD-10-CM

## 2022-07-07 RX ORDER — LOSARTAN POTASSIUM 25 MG/1
TABLET ORAL
Qty: 60 TABLET | Refills: 0 | Status: SHIPPED | OUTPATIENT
Start: 2022-07-07 | End: 2022-07-14 | Stop reason: DRUGHIGH

## 2022-07-07 NOTE — TELEPHONE ENCOUNTER
Hammad Wright has requested a refill of losartan (COZAAR) 25 mg tablet  Would a refill be appropriate?

## 2022-07-14 ENCOUNTER — OFFICE VISIT (OUTPATIENT)
Dept: INTERNAL MEDICINE CLINIC | Facility: CLINIC | Age: 42
End: 2022-07-14
Payer: COMMERCIAL

## 2022-07-14 VITALS
BODY MASS INDEX: 48.96 KG/M2 | OXYGEN SATURATION: 98 % | SYSTOLIC BLOOD PRESSURE: 130 MMHG | TEMPERATURE: 98.2 F | WEIGHT: 286.8 LBS | HEART RATE: 79 BPM | HEIGHT: 64 IN | DIASTOLIC BLOOD PRESSURE: 80 MMHG

## 2022-07-14 DIAGNOSIS — I10 PRIMARY HYPERTENSION: ICD-10-CM

## 2022-07-14 DIAGNOSIS — E66.2 CLASS 3 OBESITY WITH ALVEOLAR HYPOVENTILATION AND BODY MASS INDEX (BMI) OF 45.0 TO 49.9 IN ADULT, UNSPECIFIED WHETHER SERIOUS COMORBIDITY PRESENT (HCC): ICD-10-CM

## 2022-07-14 DIAGNOSIS — G47.33 OSA (OBSTRUCTIVE SLEEP APNEA): Primary | ICD-10-CM

## 2022-07-14 PROCEDURE — 3079F DIAST BP 80-89 MM HG: CPT | Performed by: HOSPITALIST

## 2022-07-14 PROCEDURE — 99214 OFFICE O/P EST MOD 30 MIN: CPT | Performed by: HOSPITALIST

## 2022-07-14 PROCEDURE — 3075F SYST BP GE 130 - 139MM HG: CPT | Performed by: HOSPITALIST

## 2022-07-14 RX ORDER — LOSARTAN POTASSIUM 50 MG/1
50 TABLET ORAL DAILY
Qty: 30 TABLET | Refills: 5 | Status: SHIPPED | OUTPATIENT
Start: 2022-07-14

## 2022-07-14 NOTE — PROGRESS NOTES
INTERNAL MEDICINE FOLLOW-UP OFFICE VISIT  [facility]NAL MEDIC[facility] [place]    NAME: Mitzi Thorne  AGE: 46 y o  SEX: female  : 1980     DATE: 2022     Assessment and Plan:     1  Primary hypertension  [facility] is doing very well with controlling her blood pressure  She is very compliant with her diet and exercise  Blood pressure is now trended down to 130/80  Her blood work done during her last visit reveals no acute issues requiring intervention  2  CHUCK (obstructive sleep apnea)  Has thick neck, crowded posterior pharynx, BMI of 49  admits to snore snoring and  states that she tosses and turns in bed    wakes up very unrefreshed and wanting to go back to sleep  Patient has a stop Bang score of 4--BMI more than 35, neck circumference within 40, treated for high blood pressure, feeling tired fatigued sleepy--giving her high risk for obstructive sleep apnea  Will refer her for a sleep study     3  Class 3 obesity with alveolar hypoventilation and body mass index (BMI) of 45 0 to 49 9 in adult, unspecified whether serious comorbidity present ([facility] [address] )  BMI Counseling: Body mass index is 49 23 kg/m²  Discussed the patient's BMI with her  The BMI is above average  BMI counseling and education was provided to the patient  Nutrition recommendations include reducing portion sizes, decreasing overall calorie intake and 3-5 servings of fruits/vegetables daily  Exercise recommendations include exercising 3-5 times per week  Wants to follow up with Dr Hodan Gan for a Pap smear        6 months     Chief Complaint:     Chief Complaint   Patient presents with    Follow-up     1 month f/u        History of Present Illness:     [facility] is here for a follow-up  No specific complaints  Compliant with the blood pressure medications  Blood pressure today is 130/80  Has been walking daily and has managed to lose around 4 lb of weight  Not following any specific diet    Admits to snoring and feeling very fatigued in the morning  States that she tosses and turns in bed     The following portions of the patient's history were reviewed and updated as appropriate: allergies, current medications, past family history, past medical history, past social history, past surgical history and problem list      Review of Systems:     Review of Systems all other review of symptoms negative unless specified other     Problem List:     Patient Active Problem List   Diagnosis    Primary hypertension    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 [facility])    Encounter for medical examination to establish care    CHUCK (obstructive sleep apnea)    Hypovitaminosis D    Low grade squamous intraepithelial lesion (LGSIL) on cervical Pap smear    Family history of breast cancer    Morbid obesity with BMI of 45 0-49 9, adult ([facility] [address] )    Screening for cervical cancer    Encounter for screening mammogram for malignant neoplasm of breast        Objective:     /80 (BP Location: Left arm, Patient Position: Sitting, Cuff Size: Standard)   Pulse 79   Temp 98 2 °F (36 8 °C) (Tympanic)   Ht 5' 4" (1 626 m)   Wt 130 kg (286 lb 12 8 oz)   SpO2 98%   BMI 49 23 kg/m²     Physical Exam   General Appearance:    Alert, cooperative, no distress, obese   Head:    Normocephalic, without obvious abnormality, atraumatic   Eyes:    PERRL, conjunctiva/corneas clear, EOM's intact, fundi     benign, both eyes        Neck:   Supple, no adenopathy, no JVD   Back:     Symmetric, no curvature, ROM normal, no CVA tenderness   Lungs:     Clear to auscultation bilaterally, no wheezing or rhonchi   Heart:    Regular rate and rhythm, S1 and S2 normal, no murmur, rub   or gallop   Abdomen:     Soft, non-tender, bowel sounds active    Extremities:   Extremities normal, atraumatic, no cyanosis or edema   Psych:   Normal Affect   Neurologic:   CNII-XII intact   Normal strength, sensation and reflexes Throughout       Pertinent Laboratory/Diagnostic Studies:    Laboratory Results: I have personally reviewed the pertinent laboratory results/reports         Radiology/Other Diagnostic Testing Results: I have personally reviewed pertinent reports        Ines Benitez MD  [facility] INTERNAL MEDICINE [facility]

## 2022-09-08 ENCOUNTER — TELEPHONE (OUTPATIENT)
Dept: INTERNAL MEDICINE CLINIC | Facility: CLINIC | Age: 42
End: 2022-09-08

## 2022-09-08 NOTE — TELEPHONE ENCOUNTER
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

## 2022-09-09 DIAGNOSIS — Z11.1 SCREENING-PULMONARY TB: Primary | ICD-10-CM

## 2022-10-11 PROBLEM — Z12.4 SCREENING FOR CERVICAL CANCER: Status: RESOLVED | Noted: 2022-06-07 | Resolved: 2022-10-11

## 2023-01-10 DIAGNOSIS — I10 PRIMARY HYPERTENSION: ICD-10-CM

## 2023-01-10 DIAGNOSIS — E55.9 HYPOVITAMINOSIS D: ICD-10-CM

## 2023-01-10 RX ORDER — LOSARTAN POTASSIUM 50 MG/1
TABLET ORAL
Qty: 30 TABLET | Refills: 5 | Status: SHIPPED | OUTPATIENT
Start: 2023-01-10

## 2023-01-10 RX ORDER — MELATONIN
Qty: 90 TABLET | Refills: 2 | Status: SHIPPED | OUTPATIENT
Start: 2023-01-10

## 2023-01-10 NOTE — TELEPHONE ENCOUNTER
Skip Hewitt has requested refills of cholecalciferol (VITAMIN D3) 1,000 units tablet and losartan (COZAAR) 50 mg tablet  Would refills be appropriate?

## 2023-08-08 DIAGNOSIS — I10 PRIMARY HYPERTENSION: ICD-10-CM

## 2023-08-09 RX ORDER — LOSARTAN POTASSIUM 50 MG/1
TABLET ORAL
Qty: 30 TABLET | Refills: 0 | Status: SHIPPED | OUTPATIENT
Start: 2023-08-09 | End: 2023-09-14 | Stop reason: CLARIF

## 2023-09-14 ENCOUNTER — OFFICE VISIT (OUTPATIENT)
Dept: INTERNAL MEDICINE CLINIC | Facility: CLINIC | Age: 43
End: 2023-09-14
Payer: COMMERCIAL

## 2023-09-14 VITALS
RESPIRATION RATE: 20 BRPM | HEART RATE: 93 BPM | SYSTOLIC BLOOD PRESSURE: 154 MMHG | OXYGEN SATURATION: 98 % | BODY MASS INDEX: 48.82 KG/M2 | HEIGHT: 65 IN | WEIGHT: 293 LBS | TEMPERATURE: 97.7 F | DIASTOLIC BLOOD PRESSURE: 100 MMHG

## 2023-09-14 DIAGNOSIS — E66.01 MORBID OBESITY WITH BMI OF 45.0-49.9, ADULT (HCC): ICD-10-CM

## 2023-09-14 DIAGNOSIS — F32.89 OTHER DEPRESSION: ICD-10-CM

## 2023-09-14 DIAGNOSIS — Z12.31 ENCOUNTER FOR SCREENING MAMMOGRAM FOR MALIGNANT NEOPLASM OF BREAST: ICD-10-CM

## 2023-09-14 DIAGNOSIS — Z12.4 CERVICAL CANCER SCREENING: ICD-10-CM

## 2023-09-14 DIAGNOSIS — E55.9 HYPOVITAMINOSIS D: ICD-10-CM

## 2023-09-14 DIAGNOSIS — G47.33 OSA (OBSTRUCTIVE SLEEP APNEA): ICD-10-CM

## 2023-09-14 DIAGNOSIS — I10 PRIMARY HYPERTENSION: Primary | ICD-10-CM

## 2023-09-14 PROBLEM — F32.A DEPRESSION: Status: ACTIVE | Noted: 2023-09-14

## 2023-09-14 PROCEDURE — 99396 PREV VISIT EST AGE 40-64: CPT

## 2023-09-14 RX ORDER — LOSARTAN POTASSIUM 100 MG/1
100 TABLET ORAL DAILY
Qty: 30 TABLET | Refills: 3 | Status: SHIPPED | OUTPATIENT
Start: 2023-09-14

## 2023-09-14 RX ORDER — CITALOPRAM 20 MG/1
20 TABLET ORAL DAILY
Qty: 30 TABLET | Refills: 2 | Status: SHIPPED | OUTPATIENT
Start: 2023-09-14

## 2023-09-14 NOTE — PROGRESS NOTES
[address] [facility] INTERNAL MEDICINE [place]    NAME: Ladene Primrose  AGE: 38 y.o. SEX: female  : 1980     DATE: 2023     Assessment and Plan:     Problem List Items Addressed This Visit        Respiratory    CHUCK (obstructive sleep apnea) STOP-BANG score of 4. Elevated blood pressure could also be related to her untreated sleep apnea. Will benefit from a sleep study. Blood pressure elevated at 140/100. On losartan 50 mg daily. Relevant Orders    Ambulatory Referral to Sleep Medicine       Cardiovascular and Mediastinum    Primary hypertension - Primary-blood pressure elevated at 140/100. Likely has underlying untreated sleep apnea. We will increase her losartan from 50 to 100 mg daily. Also ordered a sleep study. Relevant Medications    losartan (Cozaar) 100 MG tablet       Other    Hypovitaminosis D- vitamin D levels. 20 when done about a year ago. :  1000 units of vitamin D daily. Recommended increase to 2000 units and will repeat vitamin D levels      Morbid obesity with BMI of 45.0-49.9, adult ([facility])  Body mass index is 49.87 kg/m². MI Counseling: Body mass index is 49.87 kg/m². Discussed the patient's BMI with her. The BMI is above average. BMI counseling and education was provided to the patient. Nutrition recommendations include reducing portion sizes, decreasing overall calorie intake and 3-5 servings of fruits/vegetables daily. Exercise recommendations include exercising 3-5 times per week. Encounter for screening mammogram for malignant neoplasm of breast    Relevant Orders    Mammo screening bilateral w cad    Depression complaining of mood changes and tearfulness at times. Stress at work as well as at home. Was on antidepressants 8 years ago no longer on it for the past 2 years. We will start patient on Celexa 20 mg daily and reassess. Next visit.   Currently declines psychiatric referral    Relevant Medications citalopram (CeleXA) 20 mg tablet   Other Visit Diagnoses     Cervical cancer screening        Relevant Orders    Ambulatory Referral to Gynecology          Immunizations and preventive care screenings were discussed with patient today. Appropriate education was printed on patient's after visit summary. Counseling:  Alcohol/drug use: discussed moderation in alcohol intake, the recommendations for healthy alcohol use, and avoidance of illicit drug use. Dental Health: discussed importance of regular tooth brushing, flossing, and dental visits. Injury prevention: discussed safety/seat belts, safety helmets, smoke detectors, carbon dioxide detectors, and smoking near bedding or upholstery. Sexual health: discussed sexually transmitted diseases, partner selection, use of condoms, avoidance of unintended pregnancy, and contraceptive alternatives. · Exercise: the importance of regular exercise/physical activity was discussed. Recommend exercise 3-5 times per week for at least 30 minutes. No follow-ups on file. Chief Complaint:     Chief Complaint   Patient presents with   • Physical Exam      History of Present Illness:     Adult Annual Physical   Patient here for a comprehensive physical exam. The patient reports problems - tiredness, depression. Patient's blood pressure is elevated. On losartan 50 mg daily. Also admits to snoring and waking up tired. Was recommended a sleep study last time but has not done it so far. Also complaining of depression with stress at home as well as at work. Was on antidepressants 8 years ago. No chronic for the past couple of years. PHQ score is 3. Working on losing weight. Diet and Physical Activity  · Diet/Nutrition: limited junk food. · Exercise: no formal exercise.       Depression Screening  PHQ-2/9 Depression Screening    Little interest or pleasure in doing things: 2 - more than half the days  Feeling down, depressed, or hopeless: 1 - several days  Trouble falling or staying asleep, or sleeping too much: 3 - nearly every day  Feeling tired or having little energy: 3 - nearly every day  Poor appetite or overeatin - several days  Feeling bad about yourself - or that you are a failure or have let yourself or your family down: 3 - nearly every day  Trouble concentrating on things, such as reading the newspaper or watching television: 0 - not at all  Moving or speaking so slowly that other people could have noticed. Or the opposite - being so fidgety or restless that you have been moving around a lot more than usual: 0 - not at all  Thoughts that you would be better off dead, or of hurting yourself in some way: 0 - not at all  PHQ-2 Score: 3  PHQ-2 Interpretation: POSITIVE depression screen  PHQ-9 Score: 13   PHQ-9 Interpretation: Moderate depression        General Health  · Sleep: sleeps poorly. · Hearing: normal - bilateral.  · Vision: no vision problems. · Dental: flosses teeth occasionally.         Review of Systems:   All other review of symptoms negative unless specified otherwise  Review of Systems   Past Medical History:     Past Medical History:   Diagnosis Date   • Ovarian cyst       Past Surgical History:     Past Surgical History:   Procedure Laterality Date   •  SECTION      x2   • HERNIA REPAIR     • OVARIAN CYST REMOVAL        Social History:     Social History     Socioeconomic History   • Marital status: /Civil Union     Spouse name: None   • Number of children: None   • Years of education: None   • Highest education level: None   Occupational History   • None   Tobacco Use   • Smoking status: Never     Passive exposure: Never   • Smokeless tobacco: Never   Substance and Sexual Activity   • Alcohol use: No     Comment: Alcohol intake:   Occasional  - As per [place]    • Drug use: No   • Sexual activity: None   Other Topics Concern   • None   Social History Narrative   • None     Social Determinants of Health     Financial Resource Strain: Not on file   Food Insecurity: Not on file   Transportation Needs: Not on file   Physical Activity: Not on file   Stress: Not on file   Social Connections: Not on file   Intimate Partner Violence: Not on file   Housing Stability: Not on file      Family History:     Family History   Problem Relation Age of Onset   • Breast cancer Mother    • Hypertension Mother    • Hypertension Father       Current Medications:     Current Outpatient Medications   Medication Sig Dispense Refill   • cholecalciferol (VITAMIN D3) 1,000 units tablet TAKE 1 TABLET BY MOUTH DAILY 90 tablet 2   • citalopram (CeleXA) 20 mg tablet Take 1 tablet (20 mg total) by mouth daily 30 tablet 2   • losartan (Cozaar) 100 MG tablet Take 1 tablet (100 mg total) by mouth daily 30 tablet 3     No current facility-administered medications for this visit. Allergies:     No Known Allergies   Physical Exam:     /100 (BP Location: Right arm, Patient Position: Sitting, Cuff Size: Large)   Pulse 93   Temp 97.7 °F (36.5 °C)   Resp 20   Ht 5' 5.25" (1.657 m)   Wt (!) 137 kg (302 lb)   SpO2 98%   BMI 49.87 kg/m²     Physical Exam   General Appearance:    Alert, cooperative, no distress, morbidly obese   Head:    Normocephalic, without obvious abnormality, atraumatic   Eyes:    PERRL, conjunctiva/corneas clear, EOM's intact, fundi     benign, both eyes        Neck:   Supple, no adenopathy, no JVD   Back:     Symmetric, no curvature, ROM normal, no CVA tenderness   Lungs:     Clear to auscultation bilaterally, no wheezing or rhonchi   Heart:    Regular rate and rhythm, S1 and S2 normal, no murmur, rub   or gallop   Abdomen:     Soft, non-tender, bowel sounds active    Extremities:   Extremities normal, atraumatic, no cyanosis or edema   Psych:   Normal Affect   Neurologic:   CNII-XII intact.  Normal strength, sensation and reflexes       Throughout       Arturo Reinoso MD  [facility] INTERNAL MEDICINE [place]

## 2023-10-16 DIAGNOSIS — E55.9 HYPOVITAMINOSIS D: ICD-10-CM

## 2023-10-16 RX ORDER — MELATONIN
Qty: 90 TABLET | Refills: 2 | Status: SHIPPED | OUTPATIENT
Start: 2023-10-16

## 2024-01-16 DIAGNOSIS — I10 PRIMARY HYPERTENSION: ICD-10-CM

## 2024-01-16 RX ORDER — LOSARTAN POTASSIUM 100 MG/1
100 TABLET ORAL DAILY
Qty: 30 TABLET | Refills: 3 | Status: SHIPPED | OUTPATIENT
Start: 2024-01-16

## 2024-05-01 ENCOUNTER — OFFICE VISIT (OUTPATIENT)
Dept: INTERNAL MEDICINE CLINIC | Facility: CLINIC | Age: 44
End: 2024-05-01
Payer: COMMERCIAL

## 2024-05-01 VITALS
BODY MASS INDEX: 50.02 KG/M2 | SYSTOLIC BLOOD PRESSURE: 140 MMHG | TEMPERATURE: 97.5 F | DIASTOLIC BLOOD PRESSURE: 94 MMHG | OXYGEN SATURATION: 98 % | HEART RATE: 84 BPM | HEIGHT: 64 IN | RESPIRATION RATE: 16 BRPM | WEIGHT: 293 LBS

## 2024-05-01 DIAGNOSIS — E66.01 MORBID OBESITY WITH BMI OF 45.0-49.9, ADULT (HCC): ICD-10-CM

## 2024-05-01 DIAGNOSIS — I10 PRIMARY HYPERTENSION: Primary | ICD-10-CM

## 2024-05-01 DIAGNOSIS — Z00.00 HEALTH CARE MAINTENANCE: ICD-10-CM

## 2024-05-01 DIAGNOSIS — F32.89 OTHER DEPRESSION: ICD-10-CM

## 2024-05-01 DIAGNOSIS — R87.612 LOW GRADE SQUAMOUS INTRAEPITHELIAL LESION (LGSIL) ON CERVICAL PAP SMEAR: ICD-10-CM

## 2024-05-01 DIAGNOSIS — E55.9 HYPOVITAMINOSIS D: ICD-10-CM

## 2024-05-01 PROCEDURE — 3077F SYST BP >= 140 MM HG: CPT

## 2024-05-01 PROCEDURE — 3725F SCREEN DEPRESSION PERFORMED: CPT

## 2024-05-01 PROCEDURE — 3080F DIAST BP >= 90 MM HG: CPT

## 2024-05-01 PROCEDURE — 99214 OFFICE O/P EST MOD 30 MIN: CPT

## 2024-05-01 RX ORDER — MELATONIN
2000 DAILY
Qty: 90 TABLET | Refills: 2 | Status: SHIPPED | OUTPATIENT
Start: 2024-05-01

## 2024-05-01 RX ORDER — LOSARTAN POTASSIUM AND HYDROCHLOROTHIAZIDE 25; 100 MG/1; MG/1
1 TABLET ORAL DAILY
Qty: 90 TABLET | Refills: 3 | Status: SHIPPED | OUTPATIENT
Start: 2024-05-01

## 2024-05-01 RX ORDER — AMLODIPINE BESYLATE 5 MG/1
5 TABLET ORAL DAILY
Qty: 90 TABLET | Refills: 3 | Status: CANCELLED | OUTPATIENT
Start: 2024-05-01

## 2024-05-01 NOTE — ASSESSMENT & PLAN NOTE
Discussed lifestyle modifications in detail including diet exercise.  Patient will like to start making those changes at this point in time.  Patient would like to leave for her weight management program as well as medication at this time  Will discuss again on the next visit

## 2024-05-01 NOTE — ASSESSMENT & PLAN NOTE
Blood Pressure: 140/94  Still above goal recheck noted 142/96  Currently maintained on losartan 100 mg once a day which was increased from last visit.  Patient does not take blood pressure at home and does not follow a sodium restriction diet  Also suspicious of untreated sleep apnea which can impair blood pressure control    Plan  Start Hyzaar 100 mg / 25 mg combination discussed side effects with patient  Send blood pressure cuff to pharmacy patient instructed to measure blood pressure daily least for the next 2 weeks alternating between morning and afternoon.  Encouraged to get sleep study completed specially since father has been recently diagnosed with CHUCK.  Discussed complication and untreated sleep apnea  Follow-up at the office in 4 months in time for annual physical

## 2024-05-01 NOTE — PROGRESS NOTES
Name: Court Lozano      : 1980      MRN: [id number]  Encounter Provider: Eloise Quispe MD  Encounter Date: 2024   Encounter department: [facility] INTERNAL MEDICINE [place]    Assessment & Plan     1. Primary hypertension  Assessment & Plan:  Blood Pressure: 140/94  Still above goal recheck noted 142/96  Currently maintained on losartan 100 mg once a day which was increased from last visit.  Patient does not take blood pressure at home and does not follow a sodium restriction diet  Also suspicious of untreated sleep apnea which can impair blood pressure control    Plan  Start Hyzaar 100 mg / 25 mg combination discussed side effects with patient  Send blood pressure cuff to pharmacy patient instructed to measure blood pressure daily least for the next 2 weeks alternating between morning and afternoon.  Encouraged to get sleep study completed specially since father has been recently diagnosed with CHUCK.  Discussed complication and untreated sleep apnea  Follow-up at the office in 4 months in time for annual physical    Orders:  -     Blood Pressure Monitoring (Blood Pressure Monitor/S Cuff) MISC; Use in the morning  -     losartan-hydrochlorothiazide (HYZAAR) 100-25 MG per tablet; Take 1 tablet by mouth daily    2. Morbid obesity with BMI of 45.0-49.9, adult (HCC)  Assessment & Plan:  Discussed lifestyle modifications in detail including diet exercise.  Patient will like to start making those changes at this point in time.  Patient would like to leave for her weight management program as well as medication at this time  Will discuss again on the next visit      3. Hypovitaminosis D  Assessment & Plan:  Continue vitamin D supplementation increased to 2000 units daily  Patient already has repeat vitamin D orders placed will follow-up    Orders:  -     cholecalciferol (VITAMIN D3) 1,000 units tablet; Take 2 tablets (2,000 Units total) by mouth daily    4. Low grade squamous intraepithelial  lesion (LGSIL) on cervical Pap smear  Assessment & Plan:  Abnormal Pap smear 7 years ago  Does not follow with gynecology on the outpatient setting.  Patient currently experiences premenopausal symptoms with irregular menses.  Referral sent for gynecology for cervical cancer screening    Orders:  -     Ambulatory Referral to Obstetrics / Gynecology; Future    5. Other depression  Assessment & Plan:  Previously was on Celexa per patient stopped taking it to 2-4 months prior  PHQ9 on this visit 1  Patient endorsed that symptoms may have been exacerbated by daughter moving further from house for college now patient feels much better has adjusted to change is able to perform her activities no SI/HI.  Will stop Celexa at this time as per patient  Discussed if symptoms recur especially if SI/HI or present to resume medication and consider BH.      6. Health care maintenance  -     Ambulatory Referral to Obstetrics / Gynecology; Future        Depression Screening and Follow-up Plan: Patient was screened for depression during today's encounter. They screened negative with a PHQ-9 score of 1.        Subjective     Mrs. Lozano is a pleasant 43-year-old female with history of hypertension, obesity, depression who presents accompanied by spouse for a follow-up visit.  Patient endorsed that has been compliant with blood pressure medications however has not follow on the outpatient setting to get sleep study completed.  Patient does not measure blood pressure at home unsure where her blood pressure cuff is however last blood pressure measurement at her pharmacy was noted elevated 140s.  Patient denies any symptoms although occasionally can get headaches in the morning and feel fatigued during the day.    In regards to depression patient stopped medications 2 to 4 months prior to visit.  Endorsed that her symptoms.  Be associated with daughter being a carlos/year in college and recently moved out of the house due to these  endorsed that may have been related to the adjustment.  At this point in time denies any sleep disturbances, anxiety, irritability, SI/HI.    During this visit patient denied any acute complaint at this time.  Discussed nutrition is not ideal.  Patient continues with weight management issues height weight fluctuates despite her physical activities as a  at school however endorsed that her diet will needs improvement.  This point in time would like to defer medications as well as management program assistance however will try to start making lifestyle modifications at this time.    In regards of healthcare maintenance does not follow closely with gynecology.  Currently her menstrual periods are regular last menstrual period on multiple April however prior to that admits to.'s.  Does have episodes of hot flashes/irritability however these are manageable.  Last Pap smear completed 7 months ago was noted abnormal other follow-up since then.  Patient also has family history of breast cancer in her mother and gastric cancer in her grandmother of mother side otherwise does not recall any other cancer history.      Review of Systems   Constitutional:  Negative for chills and fever.   HENT:  Negative for ear pain and sore throat.    Eyes:  Negative for pain and visual disturbance.   Respiratory:  Negative for cough and shortness of breath.    Cardiovascular:  Negative for chest pain and palpitations.   Gastrointestinal:  Negative for abdominal pain and vomiting.   Genitourinary:  Negative for dysuria and hematuria.   Musculoskeletal:  Negative for arthralgias and back pain.   Skin:  Negative for color change and rash.   Neurological:  Negative for seizures and syncope.   All other systems reviewed and are negative.    Nutrition Assessment and Intervention:     Reviewed food recall journal    Recommended completion of food recall journal      Other interventions: Discussed lifestyle measures including increase  lean protein diet limit junk food, saturated fats red meat.    Physical Activity Assessment and Intervention:    Activity journal reviewed    Activity journal completion recommended      Emotional and Mental Well-being, Sleep, Connectedness Assessment and Intervention:    Sleep/stress assessment performed    Depression and anxiety screening performed and reviewed    Sleep Medicine referral given      Tobacco and Toxic Substance Assessment and Intervention:     Tobacco use screening performed    Alcohol and drug use screening performed        BMI Counseling: Body mass index is 52.35 kg/m². The BMI is above normal. Nutrition recommendations include reducing portion sizes, decreasing overall calorie intake, 3-5 servings of fruits/vegetables daily, consuming healthier snacks, and increasing intake of lean protein. Exercise recommendations include moderate aerobic physical activity for 150 minutes/week.     Past Medical History:   Diagnosis Date    Ovarian cyst      Past Surgical History:   Procedure Laterality Date     SECTION      x2    HERNIA REPAIR      OVARIAN CYST REMOVAL       Family History   Problem Relation Age of Onset    Breast cancer Mother     Hypertension Mother     Hypertension Father      Social History     Socioeconomic History    Marital status: /Civil Union     Spouse name: None    Number of children: None    Years of education: None    Highest education level: None   Occupational History    None   Tobacco Use    Smoking status: Never     Passive exposure: Never    Smokeless tobacco: Never   Vaping Use    Vaping status: Never Used   Substance and Sexual Activity    Alcohol use: No     Comment: Alcohol intake:   Occasional  - As per [facility]     Drug use: No    Sexual activity: None   Other Topics Concern    None   Social History Narrative    None     Social Determinants of Health     Financial Resource Strain: Not on file   Food Insecurity: Not on file   Transportation Needs: Not on file  "  Physical Activity: Not on file   Stress: Not on file   Social Connections: Not on file   Intimate Partner Violence: Not on file   Housing Stability: Not on file     Current Outpatient Medications on File Prior to Visit   Medication Sig    [DISCONTINUED] cholecalciferol (VITAMIN D3) 1,000 units tablet TAKE 1 TABLET BY MOUTH DAILY    [DISCONTINUED] losartan (COZAAR) 100 MG tablet TAKE 1 TABLET(100 MG) BY MOUTH DAILY    [DISCONTINUED] citalopram (CeleXA) 20 mg tablet Take 1 tablet (20 mg total) by mouth daily (Patient not taking: Reported on 5/1/2024)     No Known Allergies  Immunization History   Administered Date(s) Administered    COVID-19 PFIZER VACCINE 0.3 ML IM 03/10/2021, 03/31/2021, 12/28/2021    Tdap 05/12/2017       Objective     /94 (BP Location: Left arm, Patient Position: Sitting, Cuff Size: Large)   Pulse 84   Temp 97.5 °F (36.4 °C) (Tympanic)   Resp 16   Ht 5' 4\" (1.626 m)   Wt (!) 138 kg (305 lb)   SpO2 98%   BMI 52.35 kg/m²     Physical Exam  Vitals and nursing note reviewed.   Constitutional:       General: She is not in acute distress.     Appearance: Normal appearance. She is obese. She is not ill-appearing.   HENT:      Head: Normocephalic and atraumatic.      Right Ear: External ear normal.      Left Ear: External ear normal.      Nose: Nose normal.      Mouth/Throat:      Mouth: Mucous membranes are moist.   Eyes:      Extraocular Movements: Extraocular movements intact.      Conjunctiva/sclera: Conjunctivae normal.      Pupils: Pupils are equal, round, and reactive to light.   Cardiovascular:      Rate and Rhythm: Normal rate and regular rhythm.      Pulses: Normal pulses.      Heart sounds: Normal heart sounds.   Pulmonary:      Effort: Pulmonary effort is normal.      Breath sounds: Normal breath sounds.   Abdominal:      General: Bowel sounds are normal.   Musculoskeletal:      Cervical back: Normal range of motion and neck supple.      Right lower leg: No edema.      Left " lower leg: No edema.   Neurological:      General: No focal deficit present.      Mental Status: She is alert.       Eloise Quispe MD

## 2024-05-01 NOTE — ASSESSMENT & PLAN NOTE
Previously was on Celexa per patient stopped taking it to 2-4 months prior  PHQ9 on this visit 1  Patient endorsed that symptoms may have been exacerbated by daughter moving further from house for college now patient feels much better has adjusted to change is able to perform her activities no SI/HI.  Will stop Celexa at this time as per patient  Discussed if symptoms recur especially if SI/HI or present to resume medication and consider BH.

## 2024-05-01 NOTE — ASSESSMENT & PLAN NOTE
Continue vitamin D supplementation increased to 2000 units daily  Patient already has repeat vitamin D orders placed will follow-up

## 2024-05-01 NOTE — PATIENT INSTRUCTIONS
Check Blood Pressure daily at least for the next two weeks if possible either morning, afternoon. After that, keep taking it 3-5 days a day. Goal Blood pressure <120/80. Please monitor Side effects lightheadedness, dizziness. Call back at the office.     Physical activity moderate exercise 30 min daily /5 times a week.      Please

## 2024-05-01 NOTE — ASSESSMENT & PLAN NOTE
Abnormal Pap smear 7 years ago  Does not follow with gynecology on the outpatient setting.  Patient currently experiences premenopausal symptoms with irregular menses.  Referral sent for gynecology for cervical cancer screening

## 2024-08-27 DIAGNOSIS — E55.9 HYPOVITAMINOSIS D: ICD-10-CM

## 2024-08-27 RX ORDER — CHOLECALCIFEROL (VITAMIN D3) 25 MCG
2000 TABLET ORAL DAILY
Qty: 90 TABLET | Refills: 1 | Status: SHIPPED | OUTPATIENT
Start: 2024-08-27

## 2024-11-29 DIAGNOSIS — E55.9 HYPOVITAMINOSIS D: ICD-10-CM

## 2024-11-29 RX ORDER — CHOLECALCIFEROL (VITAMIN D3) 25 MCG
2000 TABLET ORAL DAILY
Qty: 60 TABLET | Refills: 0 | Status: SHIPPED | OUTPATIENT
Start: 2024-11-29

## 2025-01-03 DIAGNOSIS — E55.9 HYPOVITAMINOSIS D: ICD-10-CM

## 2025-01-03 RX ORDER — CHOLECALCIFEROL (VITAMIN D3) 25 MCG
2000 TABLET ORAL DAILY
Qty: 60 TABLET | Refills: 0 | Status: SHIPPED | OUTPATIENT
Start: 2025-01-03

## 2025-01-28 DIAGNOSIS — Z20.828 EXPOSURE TO INFLUENZA: Primary | ICD-10-CM

## 2025-01-28 RX ORDER — OSELTAMIVIR PHOSPHATE 75 MG/1
75 CAPSULE ORAL DAILY
Qty: 7 CAPSULE | Refills: 0 | Status: SHIPPED | OUTPATIENT
Start: 2025-01-28 | End: 2025-02-04

## 2025-02-02 DIAGNOSIS — E55.9 HYPOVITAMINOSIS D: ICD-10-CM

## 2025-02-03 DIAGNOSIS — E55.9 HYPOVITAMINOSIS D: ICD-10-CM

## 2025-02-03 RX ORDER — CHOLECALCIFEROL (VITAMIN D3) 25 MCG
2000 TABLET ORAL DAILY
Qty: 60 TABLET | Refills: 0 | Status: SHIPPED | OUTPATIENT
Start: 2025-02-03 | End: 2025-02-03 | Stop reason: SDUPTHER

## 2025-02-03 RX ORDER — CHOLECALCIFEROL (VITAMIN D3) 25 MCG
2000 TABLET ORAL DAILY
Qty: 60 TABLET | Refills: 0 | Status: SHIPPED | OUTPATIENT
Start: 2025-02-03

## 2025-03-14 DIAGNOSIS — E55.9 HYPOVITAMINOSIS D: ICD-10-CM

## 2025-03-17 RX ORDER — CHOLECALCIFEROL (VITAMIN D3) 25 MCG
2000 TABLET ORAL DAILY
Qty: 60 TABLET | Refills: 2 | Status: SHIPPED | OUTPATIENT
Start: 2025-03-17

## 2025-04-30 DIAGNOSIS — I10 PRIMARY HYPERTENSION: ICD-10-CM

## 2025-05-01 DIAGNOSIS — I10 PRIMARY HYPERTENSION: ICD-10-CM

## 2025-05-01 RX ORDER — LOSARTAN POTASSIUM AND HYDROCHLOROTHIAZIDE 25; 100 MG/1; MG/1
1 TABLET ORAL DAILY
Qty: 90 TABLET | Refills: 3 | OUTPATIENT
Start: 2025-05-01

## 2025-05-01 RX ORDER — LOSARTAN POTASSIUM AND HYDROCHLOROTHIAZIDE 25; 100 MG/1; MG/1
1 TABLET ORAL DAILY
Qty: 90 TABLET | Refills: 3 | Status: SHIPPED | OUTPATIENT
Start: 2025-05-01

## 2025-07-25 ENCOUNTER — OFFICE VISIT (OUTPATIENT)
Dept: INTERNAL MEDICINE CLINIC | Facility: CLINIC | Age: 45
End: 2025-07-25
Payer: COMMERCIAL

## 2025-07-25 VITALS
OXYGEN SATURATION: 98 % | TEMPERATURE: 98.2 F | HEART RATE: 78 BPM | BODY MASS INDEX: 50.02 KG/M2 | DIASTOLIC BLOOD PRESSURE: 80 MMHG | WEIGHT: 293 LBS | SYSTOLIC BLOOD PRESSURE: 122 MMHG | RESPIRATION RATE: 14 BRPM | HEIGHT: 64 IN

## 2025-07-25 DIAGNOSIS — Z12.4 CERVICAL CANCER SCREENING: ICD-10-CM

## 2025-07-25 DIAGNOSIS — Z12.31 ENCOUNTER FOR SCREENING MAMMOGRAM FOR BREAST CANCER: ICD-10-CM

## 2025-07-25 DIAGNOSIS — E66.01 MORBID OBESITY WITH BMI OF 50.0-59.9, ADULT (HCC): ICD-10-CM

## 2025-07-25 DIAGNOSIS — E55.9 HYPOVITAMINOSIS D: ICD-10-CM

## 2025-07-25 DIAGNOSIS — Z00.00 ANNUAL PHYSICAL EXAM: Primary | ICD-10-CM

## 2025-07-25 PROBLEM — F32.2 SEVERE MAJOR DEPRESSIVE DISORDER (HCC): Status: ACTIVE | Noted: 2025-07-25

## 2025-07-25 PROCEDURE — 99396 PREV VISIT EST AGE 40-64: CPT | Performed by: HOSPITALIST

## 2025-07-25 NOTE — PROGRESS NOTES
Adult Annual Physical  Name: Court Lozano      : 1980      MRN: [id number]  Encounter Provider: Suraj Rojas MD  Encounter Date: 2025   Encounter department: [facility] INTERNAL MEDICINE [place]    :  Assessment & Plan  Annual physical exam  See below       Morbid obesity with BMI of 50.0-59.9, adult ([facility])    BMI of 51.4.  Admits to eating a lot of junk food.  Body mass index is 51.49 kg/m².  BMI Counseling: Body mass index is 51.49 kg/m². Discussed the patient's BMI with her. The BMI is above average. BMI counseling and education was provided to the patient. Nutrition recommendations include reducing portion sizes, decreasing overall calorie intake and 3-5 servings of fruits/vegetables daily. Exercise recommendations include exercising 3-5 times per week.  Will refer to weight management.  May be a candidate for bariatric surgery  Orders:  •  Ambulatory Referral to Weight Management; Future  •  Lipid panel; Future  •  Hemoglobin A1C; Future  •  TSH, 3rd generation; Future    Cervical cancer screening  Referral placed  Orders:  •  Ambulatory Referral to Obstetrics / Gynecology; Future    Encounter for screening mammogram for breast cancer  Referral placed  Orders:  •  Mammo screening bilateral w 3d and cad; Future    Hypovitaminosis D  On vitamin D supplementation.  Will recheck  Orders:  •  Vitamin D 25 hydroxy; Future    Recommended dental visit  Pap smear and mammogram  Will also check lipid profile, glycosylated hemoglobin and vitamin D levels    Annual Physical Plan       History of Present Illness     Adult Annual Physical:  Patient presents for annual physical.     Diet and Physical Activity:  - Diet/Nutrition: no special diet and frequent junk food.  - Exercise: moderate cardiovascular exercise, 1-2 times a week on average and 30-60 minutes on average.    Depression Screening:  - PHQ-2 Score: 2  - PHQ-9 Score: 7    General Health:  - Sleep: sleeps well.  - Hearing: normal hearing  "bilateral ears.  - Vision: vision problems.  - Dental: no dental visits for > 1 year, brushes teeth once daily and does not floss.    Review of Systems      Objective   /80 (BP Location: Left arm, Patient Position: Sitting, Cuff Size: Large)   Pulse 78   Temp 98.2 °F (36.8 °C) (Tympanic)   Resp 14   Ht 5' 4\" (1.626 m)   Wt 136 kg (300 lb)   SpO2 98%   BMI 51.49 kg/m²     Physical Exam  General Appearance:    Alert, cooperative, no distress, morbidly obese   Head:    Normocephalic, without obvious abnormality, atraumatic   Eyes:    PERRL, conjunctiva/corneas clear, EOM's intact, fundi     benign, both eyes        Neck:   Supple, no adenopathy, no JVD   Back:     Symmetric, no curvature, ROM normal, no CVA tenderness   Lungs:     Clear to auscultation bilaterally, no wheezing or rhonchi   Heart:    Regular rate and rhythm, S1 and S2 normal, no murmur, rub   or gallop   Abdomen:     Soft, non-tender, bowel sounds active    Extremities:   Extremities normal, atraumatic, no cyanosis or edema   Psych:   Normal Affect   Neurologic:   CNII-XII intact. Normal strength, sensation and reflexes       Throughout     "

## 2025-07-25 NOTE — PATIENT INSTRUCTIONS
"Patient Education     Routine physical for adults   The Basics   Written by the doctors and editors at [facility]   What is a physical? -- A physical is a routine visit, or \"check-up,\" with your doctor. You might also hear it called a \"wellness visit\" or \"preventive visit.\"  During each visit, the doctor will:   Ask about your physical and mental health   Ask about your habits, behaviors, and lifestyle   Do an exam   Give you vaccines if needed   Talk to you about any medicines you take   Give advice about your health   Answer your questions  Getting regular check-ups is an important part of taking care of your health. It can help your doctor find and treat any problems you have. But it's also important for preventing health problems.  A routine physical is different from a \"sick visit.\" A sick visit is when you see a doctor because of a health concern or problem. Since physicals are scheduled ahead of time, you can think about what you want to ask the doctor.  How often should I get a physical? -- It depends on your age and health. In general, for people age 21 years and older:   If you are younger than 50 years, you might be able to get a physical every 3 years.   If you are 50 years or older, your doctor might recommend a physical every year.  If you have an ongoing health condition, like diabetes or high blood pressure, your doctor will probably want to see you more often.  What happens during a physical? -- In general, each visit will include:   Physical exam - The doctor or nurse will check your height, weight, heart rate, and blood pressure. They will also look at your eyes and ears. They will ask about how you are feeling and whether you have any symptoms that bother you.   Medicines - It's a good idea to bring a list of all the medicines you take to each doctor visit. Your doctor will talk to you about your medicines and answer any questions. Tell them if you are having any side effects that bother you. You " "should also tell them if you are having trouble paying for any of your medicines.   Habits and behaviors - This includes:   Your diet   Your exercise habits   Whether you smoke, drink alcohol, or use drugs   Whether you are sexually active   Whether you feel safe at home  Your doctor will talk to you about things you can do to improve your health and lower your risk of health problems. They will also offer help and support. For example, if you want to quit smoking, they can give you advice and might prescribe medicines. If you want to improve your diet or get more physical activity, they can help you with this, too.   Lab tests, if needed - The tests you get will depend on your age and situation. For example, your doctor might want to check your:   Cholesterol   Blood sugar   Iron level   Vaccines - The recommended vaccines will depend on your age, health, and what vaccines you already had. Vaccines are very important because they can prevent certain serious or deadly infections.   Discussion of screening - \"Screening\" means checking for diseases or other health problems before they cause symptoms. Your doctor can recommend screening based on your age, risk, and preferences. This might include tests to check for:   Cancer, such as breast, prostate, cervical, ovarian, colorectal, prostate, lung, or skin cancer   Sexually transmitted infections, such as chlamydia and gonorrhea   Mental health conditions like depression and anxiety  Your doctor will talk to you about the different types of screening tests. They can help you decide which screenings to have. They can also explain what the results might mean.   Answering questions - The physical is a good time to ask the doctor or nurse questions about your health. If needed, they can refer you to other doctors or specialists, too.  Adults older than 65 years often need other care, too. As you get older, your doctor will talk to you about:   How to prevent falling at " home   Hearing or vision tests   Memory testing   How to take your medicines safely   Making sure that you have the help and support you need at home  All topics are updated as new evidence becomes available and our peer review process is complete.  This topic retrieved from [employer] on: May 02, 2024.  Topic 123918 Version 1.0  Release: 32.4.3 - C32.122  © 2024 UpToDate, Inc. and/or its affiliates. All rights reserved.  Consumer Information Use and Disclaimer   Disclaimer: This generalized information is a limited summary of diagnosis, treatment, and/or medication information. It is not meant to be comprehensive and should be used as a tool to help the user understand and/or assess potential diagnostic and treatment options. It does NOT include all information about conditions, treatments, medications, side effects, or risks that may apply to a specific patient. It is not intended to be medical advice or a substitute for the medical advice, diagnosis, or treatment of a health care provider based on the health care provider's examination and assessment of a patient's specific and unique circumstances. Patients must speak with a health care provider for complete information about their health, medical questions, and treatment options, including any risks or benefits regarding use of medications. This information does not endorse any treatments or medications as safe, effective, or approved for treating a specific patient. Up[employer]Date, Inc. and its affiliates disclaim any warranty or liability relating to this information or the use thereof.The use of this information is governed by the Terms of Use, available at https://www.woltersRobotokiuwer.com/en/know/clinical-effectiveness-terms. 2024© UpToDate, Inc. and its affiliates and/or licensors. All rights reserved.  Copyright   © 2024 Up[employer]Date, Inc. and/or its affiliates. All rights reserved.

## 2025-07-28 DIAGNOSIS — E55.9 HYPOVITAMINOSIS D: ICD-10-CM

## 2025-07-28 DIAGNOSIS — I10 PRIMARY HYPERTENSION: ICD-10-CM

## 2025-07-28 RX ORDER — CHOLECALCIFEROL (VITAMIN D3) 25 MCG
2000 TABLET ORAL DAILY
Qty: 60 TABLET | Refills: 0 | Status: SHIPPED | OUTPATIENT
Start: 2025-07-28

## 2025-07-28 RX ORDER — LOSARTAN POTASSIUM AND HYDROCHLOROTHIAZIDE 25; 100 MG/1; MG/1
1 TABLET ORAL DAILY
Qty: 30 TABLET | Refills: 0 | Status: SHIPPED | OUTPATIENT
Start: 2025-07-28 | End: 2025-08-04 | Stop reason: SDUPTHER

## 2025-08-04 DIAGNOSIS — I10 PRIMARY HYPERTENSION: ICD-10-CM

## 2025-08-04 RX ORDER — LOSARTAN POTASSIUM AND HYDROCHLOROTHIAZIDE 25; 100 MG/1; MG/1
1 TABLET ORAL DAILY
Qty: 90 TABLET | Refills: 0 | Status: SHIPPED | OUTPATIENT
Start: 2025-08-04